# Patient Record
Sex: FEMALE | Race: WHITE | Employment: OTHER | URBAN - METROPOLITAN AREA
[De-identification: names, ages, dates, MRNs, and addresses within clinical notes are randomized per-mention and may not be internally consistent; named-entity substitution may affect disease eponyms.]

---

## 2017-01-10 PROBLEM — F32.9 MAJOR DEPRESSION: Status: ACTIVE | Noted: 2017-01-10

## 2017-01-11 PROBLEM — F41.1 ANXIETY STATE: Status: ACTIVE | Noted: 2017-01-11

## 2017-01-11 PROBLEM — F32.2 SEVERE MAJOR DEPRESSION (HCC): Status: ACTIVE | Noted: 2017-01-10

## 2017-01-16 ENCOUNTER — HOSPITAL ENCOUNTER (OUTPATIENT)
Dept: ULTRASOUND IMAGING | Facility: HOSPITAL | Age: 77
Discharge: HOME | End: 2017-01-16
Attending: INTERNAL MEDICINE
Payer: MEDICARE

## 2017-01-16 ENCOUNTER — HOSPITAL ENCOUNTER (EMERGENCY)
Facility: HOSPITAL | Age: 77
Discharge: NURSING, REHAB OR LONG-TERM CARE | End: 2017-01-16
Attending: EMERGENCY MEDICINE
Payer: MEDICARE

## 2017-01-16 VITALS
SYSTOLIC BLOOD PRESSURE: 112 MMHG | HEART RATE: 65 BPM | RESPIRATION RATE: 19 BRPM | TEMPERATURE: 99 F | HEIGHT: 65 IN | WEIGHT: 103 LBS | OXYGEN SATURATION: 99 % | BODY MASS INDEX: 17.16 KG/M2 | DIASTOLIC BLOOD PRESSURE: 77 MMHG

## 2017-01-16 DIAGNOSIS — I82.4Z2 ACUTE DEEP VEIN THROMBOSIS (DVT) OF DISTAL VEIN OF LEFT LOWER EXTREMITY (HCC): Primary | ICD-10-CM

## 2017-01-16 PROCEDURE — 99283 EMERGENCY DEPT VISIT LOW MDM: CPT

## 2017-01-16 PROCEDURE — 93970 EXTREMITY STUDY: CPT

## 2017-01-16 NOTE — ED INITIAL ASSESSMENT (HPI)
Pt c/o left posterior upper calf pain since Friday. Pt states painful when bearing weight. Pt inpatient from [facility]. Pt presents with positive DVT to left lower extremity.

## 2017-01-17 PROBLEM — F32.A DEPRESSION: Status: ACTIVE | Noted: 2017-01-17

## 2017-01-17 NOTE — CM/SW NOTE
Checked benefits for Eliquis with [employer] coverage. Patient's expected co-pay is $33-55 per months supply.

## 2017-01-17 NOTE — ED PROVIDER NOTES
Patient Seen in: [facility] Emergency Department    History   Patient presents with:  Deep Vein Thrombosis (cardiovascular)    Stated Complaint: pos dvt from julieth    HPI    53-year-old female presents emergency department who complains of left poster 99 °F (37.2 °C)   Temp src 01/16/17 1739 Temporal   SpO2 01/16/17 1739 98 %   O2 Device 01/16/17 1739 None (Room air)       Current:/77 mmHg  Pulse 65  Temp(Src) 99 °F (37.2 °C) (Temporal)  Resp 19  Ht 165.1 cm (5' 5\")  Wt 46.72 kg  BMI 17.14 kg/m2 Oral Tab  Take 2 tablets (10 mg total) by mouth 2 (two) times daily.   Qty: 28 tablet Refills: 0

## 2017-04-12 PROCEDURE — 87624 HPV HI-RISK TYP POOLED RSLT: CPT | Performed by: OBSTETRICS & GYNECOLOGY

## 2017-04-12 PROCEDURE — 88175 CYTOPATH C/V AUTO FLUID REDO: CPT | Performed by: OBSTETRICS & GYNECOLOGY

## (undated) NOTE — ED AVS SNAPSHOT
[facility] Emergency Department    [place]  [address] [address]    Phone:  [phone]    Fax:  [phone]           Sai Jaime   MRN: [id number]    Department:  [facility] Emergency Department   Date of Visit:  1/16 Click [email or web address]      Or call [phone]    If you have any problems with your follow-up, please call our  at [phone]    Si usted tiene algun problema con singletary sequimiento, por favor llame a nuestro adminstrador de casos al ([facility]        Pharmacy Address Phone Number   [address] [facility] ([address] [facility]) [address] [facility] [address] [place] [address] 289. ([address] [facility]) [address] [facility] information, go to [email or web address] and click on the Sign Up Now link in the [employer] box. Enter your [employer] Activation Code exactly as it appears below along with your Zip Code and Date of Birth to complete the sign-up process.  If you do

## (undated) NOTE — ED AVS SNAPSHOT
[facility] Emergency Department    [place]  [address] [address]    Phone:  [phone]    Fax:  [phone]           Devante Novak   MRN: [id number]    Department:  [facility] Emergency Department   Date of Visit:  1/16 IF THERE IS ANY CHANGE OR WORSENING OF YOUR CONDITION, CALL YOUR PRIMARY CARE PHYSICIAN AT ONCE OR RETURN IMMEDIATELY TO THE EMERGENCY DEPARTMENT.     If you have been prescribed any medication(s), please fill your prescription right away and begin taking t